# Patient Record
Sex: FEMALE | Race: WHITE | NOT HISPANIC OR LATINO | URBAN - METROPOLITAN AREA
[De-identification: names, ages, dates, MRNs, and addresses within clinical notes are randomized per-mention and may not be internally consistent; named-entity substitution may affect disease eponyms.]

---
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
## 2025-01-30 NOTE — ED PROCEDURE NOTE - CPROC ED WOUND CLOSURE1
-takes several OTC supplements   -educated supplements not FDA approved   -most recent labs reviewed, follow   -encouraged to notify clinic for new or worsening symptoms   skin suture